# Patient Record
Sex: FEMALE | Race: WHITE | NOT HISPANIC OR LATINO | Employment: OTHER | URBAN - METROPOLITAN AREA
[De-identification: names, ages, dates, MRNs, and addresses within clinical notes are randomized per-mention and may not be internally consistent; named-entity substitution may affect disease eponyms.]

---

## 2018-02-13 ENCOUNTER — HOSPITAL ENCOUNTER (EMERGENCY)
Facility: HOSPITAL | Age: 73
Discharge: HOME | End: 2018-02-13
Admitting: EMERGENCY MEDICINE
Payer: MEDICARE

## 2018-02-13 VITALS
SYSTOLIC BLOOD PRESSURE: 122 MMHG | HEIGHT: 60 IN | HEART RATE: 80 BPM | TEMPERATURE: 97.9 F | BODY MASS INDEX: 49.08 KG/M2 | WEIGHT: 250 LBS | DIASTOLIC BLOOD PRESSURE: 85 MMHG | RESPIRATION RATE: 18 BRPM | OXYGEN SATURATION: 97 %

## 2018-02-13 DIAGNOSIS — M54.9 BACK PAIN: Primary | ICD-10-CM

## 2018-02-13 PROCEDURE — 99283 EMERGENCY DEPT VISIT LOW MDM: CPT

## 2018-02-13 RX ORDER — ASPIRIN 325 MG
325 TABLET ORAL DAILY
COMMUNITY

## 2018-02-13 RX ORDER — LIDOCAINE 50 MG/G
1 PATCH TOPICAL ONCE
Status: DISCONTINUED | OUTPATIENT
Start: 2018-02-13 | End: 2018-02-13 | Stop reason: HOSPADM

## 2018-02-13 RX ORDER — SPIRONOLACTONE 25 MG/1
25 TABLET ORAL DAILY
COMMUNITY

## 2018-02-13 RX ORDER — SACUBITRIL AND VALSARTAN 49; 51 MG/1; MG/1
1 TABLET, FILM COATED ORAL 2 TIMES DAILY
COMMUNITY
End: 2018-12-18 | Stop reason: DRUGHIGH

## 2018-02-13 RX ORDER — TRAMADOL HYDROCHLORIDE 50 MG/1
50 TABLET ORAL EVERY 8 HOURS PRN
Qty: 6 TABLET | Refills: 0 | Status: SHIPPED | OUTPATIENT
Start: 2018-02-13 | End: 2018-02-15

## 2018-02-13 RX ORDER — FUROSEMIDE 20 MG/1
20 TABLET ORAL DAILY
COMMUNITY

## 2018-02-13 RX ORDER — AMOXICILLIN 500 MG
2400 CAPSULE ORAL
COMMUNITY

## 2018-02-13 RX ORDER — MULTIVIT WITH MINERALS/LUTEIN
1000 TABLET ORAL DAILY
COMMUNITY

## 2018-02-13 RX ORDER — PRAVASTATIN SODIUM 20 MG
20 TABLET ORAL DAILY
COMMUNITY

## 2018-02-13 RX ORDER — TRAMADOL HYDROCHLORIDE 50 MG/1
50 TABLET ORAL ONCE
Status: COMPLETED | OUTPATIENT
Start: 2018-02-13 | End: 2018-02-13

## 2018-02-13 RX ORDER — CARVEDILOL 25 MG/1
25 TABLET ORAL 2 TIMES DAILY WITH MEALS
COMMUNITY

## 2018-02-13 RX ADMIN — LIDOCAINE 1 PATCH: 50 PATCH TOPICAL at 16:16

## 2018-02-13 RX ADMIN — TRAMADOL HYDROCHLORIDE 50 MG: 50 TABLET, FILM COATED ORAL at 16:15

## 2018-02-13 NOTE — DISCHARGE INSTRUCTIONS
Acute Low Back Pain   WHAT YOU NEED TO KNOW:   Acute low back pain is sudden discomfort in your lower back area that lasts for up to 6 weeks  The discomfort makes it difficult to tolerate activity  DISCHARGE INSTRUCTIONS:   Return to the emergency department if:   · You have severe pain  · You have sudden stiffness and heaviness on both buttocks down to both legs  · You have numbness or weakness in one leg, or pain in both legs  · You have numbness in your genital area or across your lower back  · You cannot control your urine or bowel movements  Contact your healthcare provider if:   · You have a fever  · You have pain at night or when you rest     · Your pain does not get better with treatment  · You have pain that worsens when you cough or sneeze  · You suddenly feel something pop or snap in your back  · You have questions or concerns about your condition or care  Medicines: The following medicines may be ordered by your healthcare provider:  · Acetaminophen  decreases pain  It is available without a doctor's order  Ask how much to take and how often to take it  Follow directions  Acetaminophen can cause liver damage if not taken correctly  · NSAIDs  help decrease swelling and pain  This medicine is available with or without a doctor's order  NSAIDs can cause stomach bleeding or kidney problems in certain people  If you take blood thinner medicine, always ask your healthcare provider if NSAIDs are safe for you  Always read the medicine label and follow directions  · Prescription pain medicine  may be given  Ask your healthcare provider how to take this medicine safely  · Muscle relaxers  decrease pain by relaxing the muscles in your lower spine  · Take your medicine as directed  Contact your healthcare provider if you think your medicine is not helping or if you have side effects  Tell him of her if you are allergic to any medicine   Keep a list of the medicines, vitamins, and herbs you take  Include the amounts, and when and why you take them  Bring the list or the pill bottles to follow-up visits  Carry your medicine list with you in case of an emergency  Self-care:   · Stay active  as much as you can without causing more pain  Bed rest could make your back pain worse  Start with some light exercises such as walking  Avoid heavy lifting until your pain is gone  Ask for more information about the activities or exercises that are right for you  · Ice  helps decrease swelling, pain, and muscle spams  Put crushed ice in a plastic bag  Cover it with a towel  Place it on your lower back for 20 to 30 minutes every 2 hours  Do this for about 2 to 3 days after your pain starts, or as directed  · Heat  helps decrease pain and muscle spasms  Start to use heat after treatment with ice has stopped  Use a small towel dampened with warm water or a heating pad, or sit in a warm bath  Apply heat on the area for 20 to 30 minutes every 2 hours for as many days as directed  Alternate heat and ice  Prevent acute low back pain:   · Use proper body mechanics  ¨ Bend at the hips and knees when you  objects  Do not bend from the waist  Use your leg muscles as you lift the load  Do not use your back  Keep the object close to your chest as you lift it  Try not to twist or lift anything above your waist     ¨ Change your position often when you stand for long periods of time  Rest one foot on a small box or footrest, and then switch to the other foot often  ¨ Try not to sit for long periods of time  When you do, sit in a straight-backed chair with your feet flat on the floor  Never reach, pull, or push while you are sitting  · Do exercises that strengthen your back muscles  Warm up before you exercise  Ask your healthcare provider the best exercises for you  · Maintain a healthy weight  Ask your healthcare provider how much you should weigh   Ask him to help you create a weight loss plan if you are overweight  Follow up with your healthcare provider as directed:  Return for a follow-up visit if you still have pain after 1 to 3 weeks of treatment  You may need to visit an orthopedist if your back pain lasts more than 12 weeks  Write down your questions so you remember to ask them during your visits  © 2017 [address]  Information is for End User's use only and may not be sold, redistributed or otherwise used for commercial purposes  All illustrations and images included in CareNotes® are the copyrighted property of [employer]  or [employer]  The above information is an  only  It is not intended as medical advice for individual conditions or treatments  Talk to your doctor, nurse or pharmacist before following any medical regimen to see if it is safe and effective for you

## 2018-02-13 NOTE — ED PROVIDER NOTES
History  Chief Complaint   Patient presents with    Back Pain     c/o left lower back pain for 2 days got worse last night, no known injury no radiation of pain     Patient is a 67-year-old female presenting today with lower back pain that is nonradiating worse on the right side ranking 7/10  States that the pain began a few days ago that has gradually worsened  Worsened with any type of movement or ambulating  Has been ambulating without difficulty using a cane  Otherwise feeling well without any complaints  Has been taking Advil with minimal relief  Denies saddle anesthesia, numbness, paresthesias, changes in urination, weakness, fevers, nausea, vomiting  Prior to Admission Medications   Prescriptions Last Dose Informant Patient Reported? Taking?    Exenatide (BYETTA 5 MCG PEN SC) 2/13/2018 at 1100  Yes Yes   Sig: Inject 5 mcg under the skin 2 (two) times a day   Omega-3 Fatty Acids (FISH OIL) 1200 MG CAPS 2/13/2018 at 1000  Yes Yes   Sig: Take 2,400 mg by mouth   aspirin 325 mg tablet 2/12/2018 at 2300  Yes Yes   Sig: Take 325 mg by mouth daily   carvedilol (COREG) 25 mg tablet 2/13/2018 at 1000  Yes Yes   Sig: Take 25 mg by mouth 2 (two) times a day with meals   co-enzyme Q-10 50 MG capsule 2/12/2018 at 2300  Yes Yes   Sig: Take 200 mg by mouth daily   furosemide (LASIX) 20 mg tablet Past Week at Unknown time  Yes Yes   Sig: Take 20 mg by mouth daily   ipratropium-albuterol (COMBIVENT RESPIMAT) inhaler 2/13/2018 at 1000  Yes Yes   Sig: Inhale 1 puff 2 (two) times a day   mometasone (ASMANEX) 220 MCG/INH inhaler 2/13/2018 at 1000  Yes Yes   Sig: Inhale 1 puff daily   pravastatin (PRAVACHOL) 20 mg tablet 2/12/2018 at 2300  Yes Yes   Sig: Take 20 mg by mouth daily   sacubitril-valsartan (ENTRESTO) 49-51 MG TABS 2/13/2018 at 1000  Yes Yes   Sig: Take 1 tablet by mouth 2 (two) times a day   spironolactone (ALDACTONE) 25 mg tablet 2/13/2018 at 1000  Yes Yes   Sig: Take 25 mg by mouth daily   vitamin E, tocopherol, 1,000 units capsule 2018 at 1000  Yes Yes   Sig: Take 1,000 Units by mouth daily      Facility-Administered Medications: None       Past Medical History:   Diagnosis Date    Cardiac disease     COPD [facility]chronic obstructive pulmonary disease) ([facility] [address] )     Pacemaker        Past Surgical History:   Procedure Laterality Date    CARDIAC DEFIBRILLATOR PLACEMENT      CARPAL TUNNEL RELEASE Bilateral      SECTION      CHOLECYSTECTOMY      HYSTERECTOMY      REPLACEMENT TOTAL KNEE Bilateral        No family history on file  I have reviewed and agree with the history as documented  Social History   Substance Use Topics    Smoking status: Former Smoker     Years: 40 00    Smokeless tobacco: Never Used    Alcohol use Yes      Comment: occassional        Review of Systems   Constitutional: Negative  Negative for chills, fever and unexpected weight change  Able to walk without difficulty  HENT: Negative  Eyes: Negative  Respiratory: Negative  Negative for cough, chest tightness, shortness of breath and wheezing  Cardiovascular: Negative  Negative for chest pain and palpitations  Gastrointestinal: Negative  Negative for abdominal pain, constipation, diarrhea, nausea and vomiting  Genitourinary: Negative  Negative for difficulty urinating, dysuria, flank pain, frequency, hematuria and urgency  Denies numbness, tingling in the groin  Musculoskeletal: Positive for back pain  Negative for arthralgias, gait problem, joint swelling, myalgias, neck pain and neck stiffness  Skin: Negative  Negative for color change  Neurological: Negative  Negative for dizziness, tremors, weakness, light-headedness, numbness and headaches  Denies numbness  Denies shooting pain down arms/ legs  All other systems reviewed and are negative        Physical Exam  ED Triage Vitals [18 1440]   Temperature Pulse Respirations Blood Pressure SpO2   97 9 °F (36 6 °C) 80 18 122/85 97 %      Temp Source Heart Rate Source Patient Position - Orthostatic VS BP Location FiO2 (%)   Tympanic Monitor Sitting Right arm --      Pain Score       Worst Possible Pain           Orthostatic Vital Signs  Vitals:    02/13/18 1440   BP: 122/85   Pulse: 80   Patient Position - Orthostatic VS: Sitting       Physical Exam   Constitutional: She is oriented to person, place, and time  She appears well-developed and well-nourished  HENT:   Head: Normocephalic and atraumatic  Eyes: Conjunctivae and EOM are normal  Pupils are equal, round, and reactive to light  Neck: Normal range of motion  Neck supple  Cardiovascular: Normal rate, regular rhythm, normal heart sounds and intact distal pulses  Exam reveals no gallop and no friction rub  No murmur heard  Pulmonary/Chest: Effort normal and breath sounds normal  No respiratory distress  She has no wheezes  She has no rales  She exhibits no tenderness  spo2 is 97% indicating adequate oxygenation  Musculoskeletal:        Arms:  Neurological: She is alert and oriented to person, place, and time  Skin: Skin is warm and dry  Capillary refill takes less than 2 seconds  Nursing note and vitals reviewed  ED Medications  Medications   traMADol (ULTRAM) tablet 50 mg (not administered)   lidocaine (LIDODERM) 5 % patch 1 patch (not administered)       Diagnostic Studies  Results Reviewed     None                 No orders to display              Procedures  Procedures       Phone Contacts  ED Phone Contact    ED Course  ED Course                                MDM  Number of Diagnoses or Management Options  Back pain:   Diagnosis management comments: I discussed with patient multiple treatment options for her likely lumbosacral pain  Has taken tramadol without any issues in the past   Will prescribe her a short course and lidocaine patch    Also encouraged low dose ibuprofen for short time and will have patient follow up with her PCP in a few days for re-evaluation  Patient is informed to return to the emergency department for worsening of symptoms and was given proper education regarding their diagnosis and symptoms  Otherwise the patient is informed to follow up with their primary care doctor for re-evaluation  The patient verbalizes understanding and agrees with above assessment and plan  All questions were answered  Please Note: Fluency Direct voice recognition software may have been used in the creation of this document  Wrong words or sound a like substitutions may have occurred due to the inherent limitations of the voice software  Amount and/or Complexity of Data Reviewed  Review and summarize past medical records: yes  Independent visualization of images, tracings, or specimens: yes      CritCare Time    Disposition  Final diagnoses:   Back pain     Time reflects when diagnosis was documented in both MDM as applicable and the Disposition within this note     Time User Action Codes Description Comment    2/13/2018  4:10 PM Teofilo Mora [M54 9] Back pain       ED Disposition     ED Disposition Condition Comment    Discharge  Carlos Bush discharge to home/self care  Condition at discharge: Good        Follow-up Information     Follow up With Specialties Details Why Contact Info Additional [address] Emergency Department Emergency Medicine Go to If symptoms worsen such as falls, weakness, changes in urination    [address] [place] [address]  [phone] [facility], [place], [place], [address] [facility],  Family Medicine Schedule an appointment as soon as possible for a visit in 3 days  [address] [facility]  [phone]           Patient's Medications   Discharge Prescriptions    LIDOCAINE (XYLOCAINE) 2 % TOPICAL GEL    Apply 1 application topically 3 (three) times a day for 10 days       Start Date: 2/13/2018 End Date: 2/23/2018 Order Dose: 1 application       Quantity: 30 mL    Refills: 0    TRAMADOL (ULTRAM) 50 MG TABLET    Take 1 tablet (50 mg total) by mouth every 8 (eight) hours as needed for moderate pain for up to 2 days       Start Date: 2/13/2018 End Date: 2/15/2018       Order Dose: 50 mg       Quantity: 6 tablet    Refills: 0     No discharge procedures on file      ED Provider  Electronically Signed by           Jaden Wilson PA-C  02/13/18 1329

## 2018-12-18 ENCOUNTER — OFFICE VISIT (OUTPATIENT)
Dept: URGENT CARE | Facility: CLINIC | Age: 73
End: 2018-12-18
Payer: MEDICARE

## 2018-12-18 VITALS
SYSTOLIC BLOOD PRESSURE: 130 MMHG | BODY MASS INDEX: 47.71 KG/M2 | RESPIRATION RATE: 18 BRPM | DIASTOLIC BLOOD PRESSURE: 65 MMHG | OXYGEN SATURATION: 97 % | WEIGHT: 243 LBS | HEART RATE: 72 BPM | TEMPERATURE: 98.3 F | HEIGHT: 60 IN

## 2018-12-18 DIAGNOSIS — S46.911A SHOULDER STRAIN, RIGHT, INITIAL ENCOUNTER: Primary | ICD-10-CM

## 2018-12-18 PROCEDURE — 99213 OFFICE O/P EST LOW 20 MIN: CPT | Performed by: NURSE PRACTITIONER

## 2018-12-18 RX ORDER — BACLOFEN 10 MG/1
10 TABLET ORAL 3 TIMES DAILY PRN
Qty: 15 TABLET | Refills: 0 | Status: SHIPPED | OUTPATIENT
Start: 2018-12-18 | End: 2018-12-23

## 2018-12-18 RX ORDER — BACLOFEN 10 MG/1
10 TABLET ORAL 3 TIMES DAILY
Qty: 30 TABLET | Refills: 0 | Status: SHIPPED | OUTPATIENT
Start: 2018-12-18 | End: 2018-12-18 | Stop reason: SDUPTHER

## 2018-12-18 NOTE — PROGRESS NOTES
[facility] [employer] [facility]        NAME: Darlyn Callejas is a 68 y o  female  : 1945    MRN: [id number]  DATE: 2018  TIME: 1:09 PM    Assessment and Plan   Shoulder strain, right, initial encounter [E77 [id number]]  1  Shoulder strain, right, initial encounter  baclofen 10 mg tablet         Patient Instructions   You were prescribed a muscle relaxant, you can take it 3 times per day as needed for pain  Initially take 1/2 pill with food to determine if the medication makes you sleepy  This medication should only be taken while at home, do not drive while taking the medicine  You can use tylenol/ibuprofen as needed  Do not keep the extremity still, light exercise will be beneficial, there are some attached  If exercises cause pain do not continue them, follow up with PCP  Apply ice or heat as needed  Follow up with PCP in 3-5 days  Orthopedic information is being provided as a follow up as needed for shoulder pain  If symptoms persist or worsen, your extremity become numb or tingling, pain becomes worse follow up in the emergency room    Follow up with PCP in 3-5 days  Proceed to  ER if symptoms worsen  Chief Complaint     Chief Complaint   Patient presents with    Shoulder Pain     right shoulder pain that radiates to neck  pt has had pain issues with this shoulder in the past   pt took tylenol pm last night which helped somewhat         History of Present Illness       68year old female presents with right shoulder pain that radiates to her neck  The pain began Monday morning, it has been constant and radiates up her neck  She has had shoulder pain in the past, but with out the pain radiation to the neck  She denies ant falls or trauma, but states on  she was writing out cards for several hours  She is right hand dominant  She denies any numbness or tingling down her arm  She complains of decreased strength and ROM    She took Tylenol PM with some relief, but still had to sleep on her back, which is uncomfortable  She denies any CP or SOB  Review of Systems   Review of Systems   Musculoskeletal: Positive for myalgias, neck pain and neck stiffness  Negative for joint swelling           Current Medications       Current Outpatient Prescriptions:     aspirin 325 mg tablet, Take 325 mg by mouth daily, Disp: , Rfl:     carvedilol (COREG) 25 mg tablet, Take 25 mg by mouth 2 (two) times a day with meals, Disp: , Rfl:     co-enzyme Q-10 50 MG capsule, Take 200 mg by mouth daily, Disp: , Rfl:     Exenatide (BYETTA 5 MCG PEN SC), Inject 5 mcg under the skin 2 (two) times a day, Disp: , Rfl:     furosemide (LASIX) 20 mg tablet, Take 20 mg by mouth daily, Disp: , Rfl:     ipratropium-albuterol (COMBIVENT RESPIMAT) inhaler, Inhale 1 puff 2 (two) times a day, Disp: , Rfl:     mometasone (ASMANEX) 220 MCG/INH inhaler, Inhale 1 puff daily, Disp: , Rfl:     Omega-3 Fatty Acids (FISH OIL) 1200 MG CAPS, Take 2,400 mg by mouth, Disp: , Rfl:     pravastatin (PRAVACHOL) 20 mg tablet, Take 20 mg by mouth daily, Disp: , Rfl:     sacubitril-valsartan (ENTRESTO)  MG TABS, Take 1 tablet by mouth 2 (two) times a day, Disp: , Rfl:     spironolactone (ALDACTONE) 25 mg tablet, Take 25 mg by mouth daily, Disp: , Rfl:     vitamin E, tocopherol, 1,000 units capsule, Take 1,000 Units by mouth daily, Disp: , Rfl:     baclofen 10 mg tablet, Take 1 tablet (10 mg total) by mouth 3 (three) times a day for 10 days, Disp: 30 tablet, Rfl: 0    lidocaine (XYLOCAINE) 2 % topical gel, Apply 1 application topically 3 (three) times a day for 10 days, Disp: 30 mL, Rfl: 0    Current Allergies     Allergies as of 12/18/2018 - Reviewed 12/18/2018   Allergen Reaction Noted    Horse-derived products  02/13/2018            The following portions of the patient's history were reviewed and updated as appropriate: allergies, current medications, past family history, past medical history, past social history, past surgical history and problem list      Past Medical History:   Diagnosis Date    Cardiac disease     COPD [facility]chronic obstructive pulmonary disease) ([facility] [address] )     Pacemaker        Past Surgical History:   Procedure Laterality Date    CARDIAC DEFIBRILLATOR PLACEMENT      CARPAL TUNNEL RELEASE Bilateral      SECTION      CHOLECYSTECTOMY      HYSTERECTOMY      REPLACEMENT TOTAL KNEE Bilateral        History reviewed  No pertinent family history  Medications have been verified  Objective   /65   Pulse 72   Temp 98 3 °F (36 8 °C)   Resp 18   Ht 5' (1 524 m)   Wt 110 kg (243 lb)   SpO2 97%   BMI 47 46 kg/m²        Physical Exam     Physical Exam   Constitutional: She appears well-developed and well-nourished  No distress  Musculoskeletal:        Right shoulder: She exhibits decreased range of motion, tenderness and pain  She exhibits no swelling and no deformity  Spine and paraspinal muscles palpated, no tenderness with palpation  Bony prominences palpated with out tenderness  There is no deformity visualized, skin is pink and warm, no bruising or wounds noted  Pain with palpation to the right trapezius muscle that extends to the base of the left neck  Pt has decrease ROM with right and left neck rotation,  strength is moderate, has decrease ROM with extension and flexion  Flexion can extend to approximately 90 degrees, extension approximately 30 degrees, shoulder abduction is approximately 80 degrees  She has no pain or difficulty with pronation or supination  Nursing note and vitals reviewed

## 2018-12-18 NOTE — PATIENT INSTRUCTIONS
You were prescribed a muscle relaxant, you can take it 3 times per day as needed for pain  Initially take 1/2 pill with food to determine if the medication makes you sleepy  This medication should only be taken while at home, do not drive while taking the medicine  You can use tylenol/ibuprofen as needed  Do not keep the extremity still, light exercise will be beneficial, there are some attached  If exercises cause pain do not continue them, follow up with PCP  Apply ice or heat as needed  Follow up with PCP in 3-5 days  Orthopedic information is being provided as a follow up as needed for shoulder pain  If symptoms persist or worsen, your extremity become numb or tingling, pain becomes worse follow up in the emergency room  Early Postoperative or Post Injury Shoulder Exercises   WHAT YOU NEED TO KNOW:   What do I need to know about starting an exercise program after surgery or an injury? You may need to wait until your swelling and pain have gone down before you start to exercise  Do not start an exercise program before you talk to your healthcare provider  What should I do before I exercise? Warm up and stretch before you exercise  Walk or ride a stationary bike for 5 to 10 minutes to help you warm up  Stretching helps increase range of motion  It may also decrease muscle soreness and help prevent another injury  Your healthcare provider will tell you which of the following stretches to do:  · Crossover arm stretch:  Relax your shoulders  Hold your upper arm with the opposite hand  Pull your arm across your chest until you feel a stretch  Hold the stretch for 30 seconds  Return to the starting position  · Shoulder flexion stretch:  Stand facing a wall  Slowly walk your fingers up the wall until you feel a stretch  Hold the stretch for 30 seconds  Return to the starting position  · Sleeper stretch:  Lie on your injured side on a firm, flat surface   Bend the elbow of your injured arm 90° with your hand facing up  Use your arm that is not injured to slowly push your injured arm down  Stop when you feel a stretch at the back of your injured shoulder  Hold the stretch for 30 seconds  Slowly return to the starting position  How do I perform exercises without a weight or an exercise band? · Pendulum swings:  Lean forward and rest the arm that is not injured on a table  Do not round your back or lock your knees during the exercise  Let your other arm hang freely by your side  Gently swing your injured arm forward and backward, side to side, and in circles  · Shrugs:  Stand with your arms by your side  Gently lift your shoulders up to your ears and hold for 5 seconds  With your shoulders lifted, pinch your shoulder blades together  Hold for 5 seconds  Slowly return to the starting position  How do I perform exercises with a weight? Hold a weight with your arm slightly in front of your body  Slowly raise your arm to the side with your thumb pointing up or down as directed  Stop when you reach the level of your shoulder  Hold for as many seconds as directed  Slowly return to the starting position  How do I perform exercises with an exercise band? · Hold the exercise band with both hands in front of your body  Slowly raise your injured arm up and to the side with your thumb pointing up or down as directed  Stop when you reach the level of your shoulder  Hold for as many seconds as directed  Slowly return to the starting position  · Tie one end of the exercise band to a heavy object  Stand and hold the band in your hand  Bend your elbow  Keep your arm close to your side and pull the band straight back  Squeeze your shoulder blades together as you pull  Slowly return to the starting position  When should I contact my healthcare provider?    · You have sharp or worsening pain during exercise or at rest     · You have questions or concerns about your shoulder exercises  CARE AGREEMENT:   You have the right to help plan your care  Learn about your health condition and how it may be treated  Discuss treatment options with your caregivers to decide what care you want to receive  You always have the right to refuse treatment  The above information is an  only  It is not intended as medical advice for individual conditions or treatments  Talk to your doctor, nurse or pharmacist before following any medical regimen to see if it is safe and effective for you  © 2017 [facility] [facility] Information is for End User's use only and may not be sold, redistributed or otherwise used for commercial purposes  All illustrations and images included in CareNotes® are the copyrighted property of A [employer]  or [employer]

## 2019-02-06 ENCOUNTER — APPOINTMENT (OUTPATIENT)
Dept: RADIOLOGY | Facility: CLINIC | Age: 74
End: 2019-02-06
Payer: MEDICARE

## 2019-02-06 ENCOUNTER — TRANSCRIBE ORDERS (OUTPATIENT)
Dept: ADMINISTRATIVE | Facility: HOSPITAL | Age: 74
End: 2019-02-06

## 2019-02-06 DIAGNOSIS — R07.81 PAIN IN RIB: Primary | ICD-10-CM

## 2019-02-06 DIAGNOSIS — R07.81 PAIN IN RIB: ICD-10-CM

## 2019-02-06 PROCEDURE — 71101 X-RAY EXAM UNILAT RIBS/CHEST: CPT

## 2021-02-17 ENCOUNTER — IMMUNIZATIONS (OUTPATIENT)
Dept: FAMILY MEDICINE CLINIC | Facility: HOSPITAL | Age: 76
End: 2021-02-17

## 2021-02-17 DIAGNOSIS — Z23 ENCOUNTER FOR IMMUNIZATION: Primary | ICD-10-CM

## 2021-02-17 PROCEDURE — 0011A SARS-COV-2 / COVID-19 MRNA VACCINE (MODERNA) 100 MCG: CPT

## 2021-02-17 PROCEDURE — 91301 SARS-COV-2 / COVID-19 MRNA VACCINE (MODERNA) 100 MCG: CPT

## 2021-03-17 ENCOUNTER — IMMUNIZATIONS (OUTPATIENT)
Dept: FAMILY MEDICINE CLINIC | Facility: HOSPITAL | Age: 76
End: 2021-03-17

## 2021-03-17 DIAGNOSIS — Z23 ENCOUNTER FOR IMMUNIZATION: Primary | ICD-10-CM

## 2021-03-17 PROCEDURE — 91301 SARS-COV-2 / COVID-19 MRNA VACCINE (MODERNA) 100 MCG: CPT | Performed by: STUDENT IN AN ORGANIZED HEALTH CARE EDUCATION/TRAINING PROGRAM

## 2021-03-17 PROCEDURE — 0012A SARS-COV-2 / COVID-19 MRNA VACCINE (MODERNA) 100 MCG: CPT | Performed by: STUDENT IN AN ORGANIZED HEALTH CARE EDUCATION/TRAINING PROGRAM

## 2023-04-18 ENCOUNTER — APPOINTMENT (OUTPATIENT)
Dept: RADIOLOGY | Facility: CLINIC | Age: 78
End: 2023-04-18
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
## 2024-10-07 ENCOUNTER — APPOINTMENT (OUTPATIENT)
Dept: RADIOLOGY | Facility: CLINIC | Age: 79
End: 2024-10-07
Payer: COMMERCIAL

## 2024-10-07 DIAGNOSIS — M25.462 EFFUSION, LEFT KNEE: ICD-10-CM

## 2024-10-07 DIAGNOSIS — M25.562 PAIN IN LEFT KNEE: ICD-10-CM

## 2024-10-07 PROCEDURE — 73560 X-RAY EXAM OF KNEE 1 OR 2: CPT
